# Patient Record
Sex: FEMALE | Race: WHITE | NOT HISPANIC OR LATINO | URBAN - METROPOLITAN AREA
[De-identification: names, ages, dates, MRNs, and addresses within clinical notes are randomized per-mention and may not be internally consistent; named-entity substitution may affect disease eponyms.]

---

## 2018-03-22 VITALS
RESPIRATION RATE: 20 BRPM | HEART RATE: 93 BPM | SYSTOLIC BLOOD PRESSURE: 92 MMHG | HEART RATE: 83 BPM | OXYGEN SATURATION: 99 % | HEART RATE: 97 BPM | HEART RATE: 82 BPM | OXYGEN SATURATION: 100 % | TEMPERATURE: 98.2 F | DIASTOLIC BLOOD PRESSURE: 101 MMHG | DIASTOLIC BLOOD PRESSURE: 69 MMHG | SYSTOLIC BLOOD PRESSURE: 104 MMHG | RESPIRATION RATE: 16 BRPM | DIASTOLIC BLOOD PRESSURE: 61 MMHG | TEMPERATURE: 98.5 F | SYSTOLIC BLOOD PRESSURE: 110 MMHG | DIASTOLIC BLOOD PRESSURE: 73 MMHG | HEIGHT: 67 IN | SYSTOLIC BLOOD PRESSURE: 100 MMHG | SYSTOLIC BLOOD PRESSURE: 114 MMHG | HEART RATE: 71 BPM | WEIGHT: 145 LBS | SYSTOLIC BLOOD PRESSURE: 116 MMHG | DIASTOLIC BLOOD PRESSURE: 70 MMHG | RESPIRATION RATE: 18 BRPM | HEART RATE: 74 BPM | DIASTOLIC BLOOD PRESSURE: 65 MMHG | SYSTOLIC BLOOD PRESSURE: 101 MMHG | SYSTOLIC BLOOD PRESSURE: 102 MMHG | HEART RATE: 76 BPM | RESPIRATION RATE: 14 BRPM | RESPIRATION RATE: 15 BRPM | SYSTOLIC BLOOD PRESSURE: 122 MMHG | DIASTOLIC BLOOD PRESSURE: 55 MMHG

## 2018-03-22 PROBLEM — Z12.11 SCREENING FOR MALIGNANT NEOPLASMS OF COLON: Status: ACTIVE | Noted: 2018-03-23

## 2018-03-23 ENCOUNTER — AMBULATORY SURGICAL CENTER (AMBULATORY)
Dept: URBAN - METROPOLITAN AREA SURGERY 17 | Facility: SURGERY | Age: 53
End: 2018-03-23
Payer: COMMERCIAL

## 2018-03-23 DIAGNOSIS — K64.1 SECOND DEGREE HEMORRHOIDS: ICD-10-CM

## 2018-03-23 DIAGNOSIS — Z83.71 FAMILY HISTORY OF COLONIC POLYPS: ICD-10-CM

## 2018-03-23 DIAGNOSIS — Z12.11 ENCOUNTER FOR SCREENING FOR MALIGNANT NEOPLASM OF COLON: ICD-10-CM

## 2018-03-23 DIAGNOSIS — K57.30 DIVERTICULOSIS OF LARGE INTESTINE WITHOUT PERFORATION OR ABS: ICD-10-CM

## 2018-03-23 PROCEDURE — 45378 DIAGNOSTIC COLONOSCOPY: CPT | Mod: 33 | Performed by: INTERNAL MEDICINE

## 2018-03-23 RX ADMIN — LIDOCAINE HYDROCHLORIDE 25 MG: 10 INJECTION, SOLUTION EPIDURAL; INFILTRATION; INTRACAUDAL; PERINEURAL at 09:38

## 2018-03-23 RX ADMIN — PROPOFOL 50 MG: 10 INJECTION, EMULSION INTRAVENOUS at 09:40

## 2018-03-23 RX ADMIN — PROPOFOL 50 MG: 10 INJECTION, EMULSION INTRAVENOUS at 09:45

## 2018-03-23 RX ADMIN — PROPOFOL 25 MG: 10 INJECTION, EMULSION INTRAVENOUS at 09:48

## 2018-03-23 RX ADMIN — PROPOFOL 100 MG: 10 INJECTION, EMULSION INTRAVENOUS at 09:38

## 2018-03-23 RX ADMIN — PROPOFOL 50 MG: 10 INJECTION, EMULSION INTRAVENOUS at 09:41

## 2018-03-27 ENCOUNTER — OFFICE VISIT (OUTPATIENT)
Dept: FAMILY MEDICINE CLINIC | Facility: CLINIC | Age: 53
End: 2018-03-27

## 2018-03-27 VITALS
HEIGHT: 68 IN | HEART RATE: 88 BPM | WEIGHT: 146 LBS | RESPIRATION RATE: 16 BRPM | DIASTOLIC BLOOD PRESSURE: 72 MMHG | BODY MASS INDEX: 22.13 KG/M2 | OXYGEN SATURATION: 99 % | TEMPERATURE: 98.4 F | SYSTOLIC BLOOD PRESSURE: 122 MMHG

## 2018-03-27 DIAGNOSIS — F41.9 ANXIETY: Primary | ICD-10-CM

## 2018-03-27 PROCEDURE — 99203 OFFICE O/P NEW LOW 30 MIN: CPT | Performed by: FAMILY MEDICINE

## 2018-03-27 RX ORDER — ESCITALOPRAM OXALATE 5 MG/1
5 TABLET ORAL DAILY
COMMUNITY
End: 2018-03-28 | Stop reason: DRUGHIGH

## 2018-03-27 NOTE — PROGRESS NOTES
Gilson Estrella is a 52 y.o. female.     Chief Complaint   Patient presents with   • Establish Care     Patient needs to establish a care.        HPI     Patient presents the office today to discuss a problem that is new to me.  Patient is a new patient to our office.  Agent only significant past medical history is a history of anxiety.  Currently taking and tolerating Lexapro.  She is unsure of the dosage if it is 5 mg daily or 10 mg daily.  She's been on this for about 8 years.  Excellent symptom control.  She wonders whether or not she should continue the medication.  No adverse side effects noted.  Family history significant for hypertension and hyperlipidemia.  She maintains a very healthy diet and exercise regimen.  She denies any tobacco or recreational drug use.  Has maybe 1 alcoholic beverage daily.    The following portions of the patient's history were reviewed and updated as appropriate: allergies, current medications, past family history, past medical history, past social history, past surgical history and problem list.    Review of Systems   Constitutional: Negative for activity change.   All other systems reviewed and are negative.      Objective  Vitals:    03/27/18 1034   BP: 122/72   Pulse: 88   Resp: 16   Temp: 98.4 °F (36.9 °C)   SpO2: 99%       Physical Exam   Constitutional: She is oriented to person, place, and time. She appears well-developed and well-nourished. No distress.   HENT:   Head: Normocephalic and atraumatic.   Right Ear: External ear normal.   Left Ear: External ear normal.   Nose: Nose normal.   Mouth/Throat: Oropharynx is clear and moist.   Eyes: Conjunctivae and EOM are normal. Pupils are equal, round, and reactive to light. Right eye exhibits no discharge. Left eye exhibits no discharge. No scleral icterus.   Neck: Normal range of motion. Neck supple.   Cardiovascular: Normal rate, regular rhythm and normal heart sounds.  Exam reveals no friction rub.    No murmur  heard.  Pulmonary/Chest: Effort normal and breath sounds normal. No respiratory distress. She has no wheezes. She has no rales.   Abdominal: Soft. Bowel sounds are normal. She exhibits no distension. There is no tenderness. There is no rebound and no guarding.   Lymphadenopathy:     She has no cervical adenopathy.   Neurological: She is alert and oriented to person, place, and time.   Skin: Skin is warm and dry. She is not diaphoretic.   Nursing note and vitals reviewed.        Current Outpatient Prescriptions:   •  escitalopram (LEXAPRO) 5 MG tablet, Take 5 mg by mouth Daily., Disp: , Rfl:   •  Multiple Vitamins-Minerals (MULTI-B-PLUS PO), Take  by mouth., Disp: , Rfl:   •  Probiotic Product (PROBIOTIC-10 PO), Take  by mouth., Disp: , Rfl:     Procedures    Lab Results (most recent)     None              Gilson was seen today for establish care.    Diagnoses and all orders for this visit:    Anxiety    Extensive conversation and history reviewed with the patient.  Will continue current medication.  Advised patient to call our office to let us know if it's 5 or 10 mg of Lexapro.  At that point will call in a 30 day supply with 3 refills.  Follow-up in 6 months for routine physical.  Records requested from previous primary care provider.      Return in about 6 months (around 9/27/2018) for Annual physical.      Gaurav Burroughs MD

## 2018-03-28 RX ORDER — ESCITALOPRAM OXALATE 10 MG/1
10 TABLET ORAL DAILY
Qty: 90 TABLET | Refills: 3
Start: 2018-03-28 | End: 2018-04-02 | Stop reason: SDUPTHER

## 2018-04-02 RX ORDER — ESCITALOPRAM OXALATE 10 MG/1
10 TABLET ORAL DAILY
Qty: 90 TABLET | Refills: 3 | Status: SHIPPED | OUTPATIENT
Start: 2018-04-02 | End: 2019-04-01 | Stop reason: SDUPTHER

## 2019-03-21 ENCOUNTER — OFFICE VISIT (OUTPATIENT)
Dept: FAMILY MEDICINE CLINIC | Facility: CLINIC | Age: 54
End: 2019-03-21

## 2019-03-21 ENCOUNTER — TELEPHONE (OUTPATIENT)
Dept: FAMILY MEDICINE CLINIC | Facility: CLINIC | Age: 54
End: 2019-03-21

## 2019-03-21 VITALS
OXYGEN SATURATION: 99 % | SYSTOLIC BLOOD PRESSURE: 108 MMHG | TEMPERATURE: 98.2 F | HEIGHT: 68 IN | WEIGHT: 147 LBS | DIASTOLIC BLOOD PRESSURE: 66 MMHG | HEART RATE: 79 BPM | BODY MASS INDEX: 22.28 KG/M2

## 2019-03-21 DIAGNOSIS — B34.9 VIRAL ILLNESS: ICD-10-CM

## 2019-03-21 DIAGNOSIS — R42 LIGHTHEADEDNESS: Primary | ICD-10-CM

## 2019-03-21 PROCEDURE — 99214 OFFICE O/P EST MOD 30 MIN: CPT | Performed by: NURSE PRACTITIONER

## 2019-03-21 PROCEDURE — 93000 ELECTROCARDIOGRAM COMPLETE: CPT | Performed by: NURSE PRACTITIONER

## 2019-03-21 RX ORDER — ONDANSETRON 4 MG/1
4 TABLET, ORALLY DISINTEGRATING ORAL EVERY 8 HOURS PRN
Qty: 30 TABLET | Refills: 0 | Status: SHIPPED | OUTPATIENT
Start: 2019-03-21 | End: 2020-09-29

## 2019-03-21 NOTE — TELEPHONE ENCOUNTER
Patient Question:    Pt had a first EKG today. Pt mentioned since this is her first EKG, there is nothing for Miller to compare this EKG with. Should patient have another in office EKG scheduled.

## 2019-03-21 NOTE — PROGRESS NOTES
"Subjective   Gilson Estrella is a 53 y.o. female.     History of Present Illness   Patient presenting to the office today with concerns of her lightheadedness nausea and dizziness that started yesterday around 5 PM.  She went about her day as she always does ate the same things drink the same amount of water and then when she was starting to work out she started to feel bad.  She is also struggled with a little bit of diarrhea as well.  She does not feel like he denied fever no constipation no sinus symptoms.  She has not tried any medications for the headache or the nausea.  She is not experiencing any dizziness it does not bother her when she looks over her shoulder when she moves from position to position.  She denies any lab work drawn since 2015 and she never had an EKG.  The following portions of the patient's history were reviewed and updated as appropriate: allergies, current medications, past social history and problem list.    Review of Systems   Gastrointestinal: Positive for nausea.   Neurological: Positive for dizziness and light-headedness.   All other systems reviewed and are negative.      Objective   /66 (BP Location: Right arm, Patient Position: Sitting)   Pulse 79   Temp 98.2 °F (36.8 °C)   Ht 172.7 cm (67.99\")   Wt 66.7 kg (147 lb)   SpO2 99%   BMI 22.36 kg/m²   Physical Exam   Constitutional: She is oriented to person, place, and time. Vital signs are normal. She appears well-developed and well-nourished. No distress.   HENT:   Head: Normocephalic.   Cardiovascular: Normal rate, regular rhythm and normal heart sounds.   Pulmonary/Chest: Effort normal and breath sounds normal.   Neurological: She is alert and oriented to person, place, and time. Gait normal.   Psychiatric: She has a normal mood and affect. Her behavior is normal. Judgment and thought content normal.   Vitals reviewed.      ECG 12 Lead  Date/Time: 3/21/2019 1:35 PM  Performed by: Miller Schaefer APRN  Authorized by: " Miller Schaefer, VELVET   Comparison: not compared with previous ECG   Previous ECG: no previous ECG available  Rhythm: sinus rhythm  Rate: normal  Conduction: conduction normal  ST Segments: ST segments normal  T inversion: V1  QRS axis: normal    Clinical impression: non-specific ECG            Assessment/Plan      Diagnosis Plan   1. Lightheadedness  CBC & Differential    Comprehensive Metabolic Panel    ECG 12 Lead   2. Viral illness  ondansetron ODT (ZOFRAN ODT) 4 MG disintegrating tablet     Follow-up after lab work.  Discussed patient's symptoms at length with her discussed if she has any worsening symptoms that she is go to the emergency room.  Discussed that female patients present differently than male patient is when he comes to heart issues.  Patient verbalized understanding.  At this time I am going to treat her as a viral illness follow-up after lab work he received Zofran for the nausea she is to return to the office if no improvement.  VELVET Otero  3/21/2019

## 2019-03-22 LAB
ALBUMIN SERPL-MCNC: 4.8 G/DL (ref 3.5–5.2)
ALBUMIN/GLOB SERPL: 2 G/DL
ALP SERPL-CCNC: 51 U/L (ref 39–117)
ALT SERPL-CCNC: 17 U/L (ref 1–33)
AST SERPL-CCNC: 19 U/L (ref 1–32)
BASOPHILS # BLD AUTO: 0.02 10*3/MM3 (ref 0–0.2)
BASOPHILS NFR BLD AUTO: 0.5 % (ref 0–1.5)
BILIRUB SERPL-MCNC: 0.3 MG/DL (ref 0.2–1.2)
BUN SERPL-MCNC: 16 MG/DL (ref 6–20)
BUN/CREAT SERPL: 24.6 (ref 7–25)
CALCIUM SERPL-MCNC: 9.9 MG/DL (ref 8.6–10.5)
CHLORIDE SERPL-SCNC: 100 MMOL/L (ref 98–107)
CO2 SERPL-SCNC: 27.9 MMOL/L (ref 22–29)
CREAT SERPL-MCNC: 0.65 MG/DL (ref 0.57–1)
EOSINOPHIL # BLD AUTO: 0.01 10*3/MM3 (ref 0–0.4)
EOSINOPHIL NFR BLD AUTO: 0.2 % (ref 0.3–6.2)
ERYTHROCYTE [DISTWIDTH] IN BLOOD BY AUTOMATED COUNT: 12.3 % (ref 12.3–15.4)
GLOBULIN SER CALC-MCNC: 2.4 GM/DL
GLUCOSE SERPL-MCNC: 104 MG/DL (ref 65–99)
HCT VFR BLD AUTO: 40.2 % (ref 34–46.6)
HGB BLD-MCNC: 12.8 G/DL (ref 12–15.9)
IMM GRANULOCYTES # BLD AUTO: 0.01 10*3/MM3 (ref 0–0.05)
IMM GRANULOCYTES NFR BLD AUTO: 0.2 % (ref 0–0.5)
LYMPHOCYTES # BLD AUTO: 0.92 10*3/MM3 (ref 0.7–3.1)
LYMPHOCYTES NFR BLD AUTO: 21.7 % (ref 19.6–45.3)
MCH RBC QN AUTO: 31.4 PG (ref 26.6–33)
MCHC RBC AUTO-ENTMCNC: 31.8 G/DL (ref 31.5–35.7)
MCV RBC AUTO: 98.5 FL (ref 79–97)
MONOCYTES # BLD AUTO: 0.37 10*3/MM3 (ref 0.1–0.9)
MONOCYTES NFR BLD AUTO: 8.7 % (ref 5–12)
NEUTROPHILS # BLD AUTO: 2.9 10*3/MM3 (ref 1.4–7)
NEUTROPHILS NFR BLD AUTO: 68.7 % (ref 42.7–76)
NRBC BLD AUTO-RTO: 0 /100 WBC (ref 0–0)
PLATELET # BLD AUTO: 300 10*3/MM3 (ref 140–450)
POTASSIUM SERPL-SCNC: 4.4 MMOL/L (ref 3.5–5.2)
PROT SERPL-MCNC: 7.2 G/DL (ref 6–8.5)
RBC # BLD AUTO: 4.08 10*6/MM3 (ref 3.77–5.28)
SODIUM SERPL-SCNC: 140 MMOL/L (ref 136–145)
WBC # BLD AUTO: 4.23 10*3/MM3 (ref 3.4–10.8)

## 2019-04-01 RX ORDER — ESCITALOPRAM OXALATE 10 MG/1
TABLET ORAL
Qty: 90 TABLET | Refills: 2 | Status: SHIPPED | OUTPATIENT
Start: 2019-04-01 | End: 2019-12-30

## 2019-12-30 RX ORDER — ESCITALOPRAM OXALATE 10 MG/1
TABLET ORAL
Qty: 90 TABLET | Refills: 1 | Status: SHIPPED | OUTPATIENT
Start: 2019-12-30 | End: 2020-06-29

## 2020-06-29 RX ORDER — ESCITALOPRAM OXALATE 10 MG/1
TABLET ORAL
Qty: 90 TABLET | Refills: 0 | Status: SHIPPED | OUTPATIENT
Start: 2020-06-29 | End: 2020-07-02

## 2020-07-02 RX ORDER — ESCITALOPRAM OXALATE 10 MG/1
TABLET ORAL
Qty: 90 TABLET | Refills: 0 | Status: SHIPPED | OUTPATIENT
Start: 2020-07-02 | End: 2020-09-29 | Stop reason: SDUPTHER

## 2020-09-16 ENCOUNTER — TELEPHONE (OUTPATIENT)
Dept: FAMILY MEDICINE CLINIC | Facility: CLINIC | Age: 55
End: 2020-09-16

## 2020-09-16 NOTE — TELEPHONE ENCOUNTER
Left message for patient to return call. Can be worked in since this is a reschedule appointment.

## 2020-09-16 NOTE — TELEPHONE ENCOUNTER
PT RETURNED A CALL FROM [facility] TO RESCHEDULE A NEW PATIENT APPOINTMENT FOR DR. RICHARDSON FROM 10/5. NEXT AVAILABLE SHOWING UP IS NOT UNTIL THE END OF December.    PLEASE CALL PT BACK AND SEE IF SHE CAN BE WORKED IN SOONER, OR ADVISE IF SHE WILL NOT BE ABLE TO BE SCHEDULED WITH DR. RICHARDSON. SHE STATES SHE COULD RESCHEDULE FOR SOONER THAN 10/5 ALSO IF THAT WAS AN OPTION.    CALLBACK NUMBER: [phone]

## 2020-09-29 ENCOUNTER — OFFICE VISIT (OUTPATIENT)
Dept: FAMILY MEDICINE CLINIC | Facility: CLINIC | Age: 55
End: 2020-09-29

## 2020-09-29 VITALS
OXYGEN SATURATION: 99 % | SYSTOLIC BLOOD PRESSURE: 118 MMHG | DIASTOLIC BLOOD PRESSURE: 76 MMHG | WEIGHT: 143.2 LBS | HEART RATE: 81 BPM | BODY MASS INDEX: 22.47 KG/M2 | TEMPERATURE: 97.5 F | HEIGHT: 67 IN

## 2020-09-29 DIAGNOSIS — Z00.00 ROUTINE HEALTH MAINTENANCE: ICD-10-CM

## 2020-09-29 DIAGNOSIS — Z13.1 SCREENING FOR DIABETES MELLITUS: ICD-10-CM

## 2020-09-29 DIAGNOSIS — F41.1 GENERALIZED ANXIETY DISORDER: Primary | ICD-10-CM

## 2020-09-29 DIAGNOSIS — Z13.220 SCREENING FOR HYPERLIPIDEMIA: ICD-10-CM

## 2020-09-29 DIAGNOSIS — Z13.29 SCREENING FOR THYROID DISORDER: ICD-10-CM

## 2020-09-29 DIAGNOSIS — Z11.59 NEED FOR HEPATITIS C SCREENING TEST: ICD-10-CM

## 2020-09-29 DIAGNOSIS — Z13.0 SCREENING FOR DEFICIENCY ANEMIA: ICD-10-CM

## 2020-09-29 PROBLEM — R42 LIGHTHEADEDNESS: Status: RESOLVED | Noted: 2019-03-21 | Resolved: 2020-09-29

## 2020-09-29 PROBLEM — B34.9 VIRAL ILLNESS: Status: RESOLVED | Noted: 2019-03-21 | Resolved: 2020-09-29

## 2020-09-29 PROCEDURE — 99214 OFFICE O/P EST MOD 30 MIN: CPT | Performed by: FAMILY MEDICINE

## 2020-09-29 RX ORDER — ESCITALOPRAM OXALATE 20 MG/1
20 TABLET ORAL DAILY
Qty: 30 TABLET | Refills: 2 | Status: SHIPPED | OUTPATIENT
Start: 2020-09-29 | End: 2020-12-04

## 2020-09-29 NOTE — PROGRESS NOTES
Gilson Estrella is a 55 y.o. female.     Chief Complaint   Patient presents with   • Establish Care     pt is here to establish care   • Anxiety     pt sasy that her anxiety is causing sleep issues, would like to switch her lexapro     Masks/face shield/appropriate PPE were worn for the entirety of the visit by the patient, MA, and provider.     HPI     Pt is a pleasant 55 y.o. YO female here for anxiety.  Patient is new to me and is here today to establish care.  She is transferring care from a former [employer] physician.  Her only chronic medical problem is her anxiety.     Anxiety - chronic, has been worsening over the course of the year.  She feels that worsening anxiety is due to several things, she recently got a new progression with work which is been a huge learning curve, she has also had anxiety regarding the pandemic and a lot of the things that were positive and for feeling to her such as social activities and travel are now limited.  She is currently taking escitalopram 10 mg once daily.  She denies any adverse side effects on it and for a couple years has done well on it in terms of anxiety control until more recently.  She has never been on any other medication for her anxiety.  She also states that she has some restless sleeping which she thinks is related to worsened anxiety.    Patient is generally healthy, tries to eat a good diet and exercise regularly.  She is concerned that she had her cholesterol levels checked at work earlier this year and they were mildly elevated and would like to recheck these.  Her father suffered from Lewy body dementia and she had a grandparent with dementia as well, she is trying to be as healthy as possible overall to prevent dementia herself.  She is up-to-date on recommended screening tests including mammogram, colonoscopy, and Pap smear.  She receives immunizations through her work at [employer] and is up-to-date on the shingles vaccine, hepatitis vaccine, and will be  receiving the seasonal flu shot very soon.      The following portions of the patient's history were reviewed by me and updated as appropriate: allergies, current medications, past family history, past medical history, past social history, past surgical history, and problem list.     Review of Systems   Constitutional: Negative.    HENT: Negative.    Eyes: Negative.    Respiratory: Negative.    Cardiovascular: Negative.  Negative for chest pain, palpitations and leg swelling.   Gastrointestinal: Negative.    Endocrine: Negative.    Genitourinary: Negative.    Musculoskeletal: Negative.    Skin: Negative.    Allergic/Immunologic: Negative.    Neurological: Negative.    Hematological: Negative.    Psychiatric/Behavioral: Positive for sleep disturbance. The patient is nervous/anxious.    I have reviewed and confirmed the accuracy of the ROS as documented by the MA/LPN/RN Fany Lion MD    Objective  Vitals:    09/29/20 1101   BP: 118/76   Pulse: 81   Temp: 97.5 °F (36.4 °C)   SpO2: 99%     Body mass index is 22.43 kg/m².       Physical Exam  Vitals signs reviewed.   Constitutional:       General: She is not in acute distress.     Appearance: She is well-developed.   HENT:      Head: Normocephalic and atraumatic.      Right Ear: Tympanic membrane, ear canal and external ear normal.      Left Ear: Tympanic membrane, ear canal and external ear normal.      Nose: Nose normal.      Mouth/Throat:      Mouth: Mucous membranes are moist.      Pharynx: Oropharynx is clear. No oropharyngeal exudate or posterior oropharyngeal erythema.   Eyes:      General: No scleral icterus.        Right eye: No discharge.         Left eye: No discharge.      Conjunctiva/sclera: Conjunctivae normal.   Neck:      Musculoskeletal: Neck supple.      Thyroid: No thyromegaly.   Cardiovascular:      Rate and Rhythm: Normal rate and regular rhythm.      Heart sounds: No murmur. No friction rub. No gallop.    Pulmonary:      Effort: Pulmonary  effort is normal. No respiratory distress.      Breath sounds: Normal breath sounds. No wheezing or rales.   Abdominal:      General: Abdomen is flat. Bowel sounds are normal. There is no distension.      Palpations: Abdomen is soft. There is no mass.      Tenderness: There is no abdominal tenderness.   Lymphadenopathy:      Cervical: No cervical adenopathy.   Skin:     General: Skin is warm and dry.   Neurological:      Mental Status: She is alert and oriented to person, place, and time.   Psychiatric:         Behavior: Behavior normal.         Thought Content: Thought content normal.         Judgment: Judgment normal.           Current Outpatient Medications:   •  escitalopram (LEXAPRO) 20 MG tablet, Take 1 tablet by mouth Daily., Disp: 30 tablet, Rfl: 2  •  MAGNESIUM GLYCINATE PLUS PO, Take  by mouth., Disp: , Rfl:   •  Multiple Vitamins-Minerals (MULTI-B-PLUS PO), Take  by mouth., Disp: , Rfl:   •  Probiotic Product (PROBIOTIC-10 PO), Take  by mouth., Disp: , Rfl:     Procedures    Lab Results (most recent)     None              Gilson was seen today for establish care and anxiety.    Diagnoses and all orders for this visit:    Generalized anxiety disorder  Patient with generalized anxiety disorder and anxiety level has been worsening recently.  Discussed with patient that I would initially trial increasing her S-Citalopram to maximum dose of 20 mg daily.  We will plan on follow-up in 5 weeks, if she does not have significant benefit on this dose will trial another medication such as sertraline or possibly buspirone.  For her sleep difficulties I recommended that she trial over-the-counter melatonin.  -     escitalopram (LEXAPRO) 20 MG tablet; Take 1 tablet by mouth Daily.    Screening for hyperlipidemia  -     Lipid Panel; Future    Screening for deficiency anemia  -     CBC & Differential; Future    Screening for thyroid disorder  -     TSH Rfx On Abnormal To Free T4; Future    Screening for diabetes  mellitus  -     Comprehensive Metabolic Panel; Future    Routine health maintenance  -     Comprehensive Metabolic Panel; Future    Need for hepatitis C screening test  -     Hepatitis C antibody; Future      In addition to the screening lab tests above which patient will have performed fasting another day I did recommend that she check if she has had a tetanus booster/Tdap in the last 10 years, she received immunizations courtesy of her work and so they have a record of what she has had.    Return in about 5 weeks (around 11/3/2020) for Recheck.      Fany Lion MD

## 2020-09-30 ENCOUNTER — RESULTS ENCOUNTER (OUTPATIENT)
Dept: FAMILY MEDICINE CLINIC | Facility: CLINIC | Age: 55
End: 2020-09-30

## 2020-09-30 DIAGNOSIS — Z00.00 ROUTINE HEALTH MAINTENANCE: ICD-10-CM

## 2020-09-30 DIAGNOSIS — Z13.1 SCREENING FOR DIABETES MELLITUS: ICD-10-CM

## 2020-09-30 DIAGNOSIS — Z13.220 SCREENING FOR HYPERLIPIDEMIA: ICD-10-CM

## 2020-09-30 DIAGNOSIS — Z13.29 SCREENING FOR THYROID DISORDER: ICD-10-CM

## 2020-09-30 DIAGNOSIS — Z11.59 NEED FOR HEPATITIS C SCREENING TEST: ICD-10-CM

## 2020-10-02 LAB
ALBUMIN SERPL-MCNC: 4.7 G/DL (ref 3.8–4.9)
ALBUMIN/GLOB SERPL: 1.9 {RATIO} (ref 1.2–2.2)
ALP SERPL-CCNC: 64 IU/L (ref 39–117)
ALT SERPL-CCNC: 17 IU/L (ref 0–32)
AST SERPL-CCNC: 23 IU/L (ref 0–40)
BASOPHILS # BLD AUTO: 0 X10E3/UL (ref 0–0.2)
BASOPHILS NFR BLD AUTO: 1 %
BILIRUB SERPL-MCNC: 0.3 MG/DL (ref 0–1.2)
BUN SERPL-MCNC: 11 MG/DL (ref 6–24)
BUN/CREAT SERPL: 15 (ref 9–23)
CALCIUM SERPL-MCNC: 9.3 MG/DL (ref 8.7–10.2)
CHLORIDE SERPL-SCNC: 99 MMOL/L (ref 96–106)
CHOLEST SERPL-MCNC: 212 MG/DL (ref 100–199)
CO2 SERPL-SCNC: 25 MMOL/L (ref 20–29)
CREAT SERPL-MCNC: 0.73 MG/DL (ref 0.57–1)
EOSINOPHIL # BLD AUTO: 0 X10E3/UL (ref 0–0.4)
EOSINOPHIL NFR BLD AUTO: 1 %
ERYTHROCYTE [DISTWIDTH] IN BLOOD BY AUTOMATED COUNT: 11.8 % (ref 11.7–15.4)
GLOBULIN SER CALC-MCNC: 2.5 G/DL (ref 1.5–4.5)
GLUCOSE SERPL-MCNC: 81 MG/DL (ref 65–99)
HCT VFR BLD AUTO: 37.7 % (ref 34–46.6)
HCV AB S/CO SERPL IA: <0.1 S/CO RATIO (ref 0–0.9)
HDLC SERPL-MCNC: 92 MG/DL
HGB BLD-MCNC: 12.8 G/DL (ref 11.1–15.9)
IMM GRANULOCYTES # BLD AUTO: 0 X10E3/UL (ref 0–0.1)
IMM GRANULOCYTES NFR BLD AUTO: 0 %
LDLC SERPL CALC-MCNC: 110 MG/DL (ref 0–99)
LYMPHOCYTES # BLD AUTO: 1.1 X10E3/UL (ref 0.7–3.1)
LYMPHOCYTES NFR BLD AUTO: 32 %
MCH RBC QN AUTO: 31.4 PG (ref 26.6–33)
MCHC RBC AUTO-ENTMCNC: 34 G/DL (ref 31.5–35.7)
MCV RBC AUTO: 92 FL (ref 79–97)
MONOCYTES # BLD AUTO: 0.4 X10E3/UL (ref 0.1–0.9)
MONOCYTES NFR BLD AUTO: 13 %
NEUTROPHILS # BLD AUTO: 1.8 X10E3/UL (ref 1.4–7)
NEUTROPHILS NFR BLD AUTO: 53 %
PLATELET # BLD AUTO: 295 X10E3/UL (ref 150–450)
POTASSIUM SERPL-SCNC: 4.4 MMOL/L (ref 3.5–5.2)
PROT SERPL-MCNC: 7.2 G/DL (ref 6–8.5)
RBC # BLD AUTO: 4.08 X10E6/UL (ref 3.77–5.28)
SODIUM SERPL-SCNC: 139 MMOL/L (ref 134–144)
TRIGL SERPL-MCNC: 56 MG/DL (ref 0–149)
TSH SERPL DL<=0.005 MIU/L-ACNC: 1.75 UIU/ML (ref 0.45–4.5)
VLDLC SERPL CALC-MCNC: 10 MG/DL (ref 5–40)
WBC # BLD AUTO: 3.3 X10E3/UL (ref 3.4–10.8)

## 2020-10-04 ENCOUNTER — RESULTS ENCOUNTER (OUTPATIENT)
Dept: FAMILY MEDICINE CLINIC | Facility: CLINIC | Age: 55
End: 2020-10-04

## 2020-10-04 DIAGNOSIS — Z13.0 SCREENING FOR DEFICIENCY ANEMIA: ICD-10-CM

## 2020-12-03 DIAGNOSIS — F41.1 GENERALIZED ANXIETY DISORDER: ICD-10-CM

## 2020-12-04 RX ORDER — ESCITALOPRAM OXALATE 20 MG/1
TABLET ORAL
Qty: 90 TABLET | Refills: 1 | Status: SHIPPED | OUTPATIENT
Start: 2020-12-04 | End: 2021-06-02

## 2020-12-04 NOTE — TELEPHONE ENCOUNTER
Patient calling for update on her script that was called in for escitalopram (LEXAPRO) 20 MG tablet. Please advise at [phone]. Pt is out of meds.    Verified pharmacy-[id number] [place] [address] AT [facility] [address] - [phone]  - [phone] FX  [phone]

## 2021-05-31 DIAGNOSIS — F41.1 GENERALIZED ANXIETY DISORDER: ICD-10-CM

## 2021-06-02 RX ORDER — ESCITALOPRAM OXALATE 20 MG/1
TABLET ORAL
Qty: 90 TABLET | Refills: 3 | Status: SHIPPED | OUTPATIENT
Start: 2021-06-02 | End: 2022-05-31 | Stop reason: SDUPTHER

## 2021-08-19 ENCOUNTER — OFFICE VISIT (OUTPATIENT)
Dept: FAMILY MEDICINE CLINIC | Facility: CLINIC | Age: 56
End: 2021-08-19

## 2021-08-19 VITALS
WEIGHT: 142 LBS | BODY MASS INDEX: 22.29 KG/M2 | DIASTOLIC BLOOD PRESSURE: 72 MMHG | OXYGEN SATURATION: 98 % | SYSTOLIC BLOOD PRESSURE: 112 MMHG | HEART RATE: 67 BPM | HEIGHT: 67 IN | TEMPERATURE: 98.7 F

## 2021-08-19 DIAGNOSIS — Z13.1 SCREENING FOR DIABETES MELLITUS: ICD-10-CM

## 2021-08-19 DIAGNOSIS — F41.1 GENERALIZED ANXIETY DISORDER: ICD-10-CM

## 2021-08-19 DIAGNOSIS — Z13.220 SCREENING FOR LIPID DISORDERS: ICD-10-CM

## 2021-08-19 DIAGNOSIS — Z00.00 ENCOUNTER FOR ROUTINE ADULT HEALTH EXAMINATION WITHOUT ABNORMAL FINDINGS: Primary | ICD-10-CM

## 2021-08-19 DIAGNOSIS — Z13.0 SCREENING FOR DEFICIENCY ANEMIA: ICD-10-CM

## 2021-08-19 PROCEDURE — 99396 PREV VISIT EST AGE 40-64: CPT | Performed by: NURSE PRACTITIONER

## 2021-08-19 RX ORDER — ESCITALOPRAM OXALATE 20 MG/1
20 TABLET ORAL DAILY
Qty: 90 TABLET | Refills: 1 | Status: CANCELLED | OUTPATIENT
Start: 2021-08-19

## 2021-08-19 NOTE — PROGRESS NOTES
Chief Complaint  Establish Care (new pt doing well no complains ) and Anxiety (follow up   no complains will  needs refills today )    Subjective          Gilson Estrella presents to [facility]  History of Present Illness  Gilson Estrella is a 55 y.o. female who presents to the clinic today to establish care and for routine health exam. She has a medical history significant for anxiety.  She has no complaints today.    Medical History:   · Anxiety: Patient is currently on escitalopram 20 mg p.o. daily. Patient was previously on 10 mg of escitalopram dose for a while and has felt better with increased dose of 20 mg. She does have mild anxiety related to the pandemic, but in general, her anxiety is much improved on her current medication.  She is sleeping well.  She denies feelings of depression.    Overall, patient states she eats a healthy diet.  She exercises 5 to 6 days a week for about 30 minutes to 45 minutes at a time.  She does a mix of cardio, yoga, and strength.  Patient currently follows with a gynecologist in [place].  She follows with her gynecologist every 2 years.  She has a mammogram scheduled for tomorrow.  Her last Pap smear was 2 years ago.  She had a colonoscopy in 2018 and was recommended for her to have another colonoscopy in 5 years.  Patient is due for Tdap vaccine today, but is not interested.  She is up-to-date on COVID-19 vaccine.  She is not currently sexually active.  She has never smoked and drinks alcohol socially.  She has a follow-up with her dentist next week.  She brushes her teeth at least twice daily.  She just had her yearly eye exam with her optometrist and reported everything was okay.  She wears glasses.    Health Maintenance   Topic Date Due   • ANNUAL PHYSICAL  Never done   • PAP SMEAR  Never done   • MAMMOGRAM  08/09/2021   • TDAP/TD VACCINES (1 - Tdap) 02/14/2022 (Originally 9/8/1984)   • INFLUENZA VACCINE  10/01/2021   • COLORECTAL CANCER  "SCREENING  03/23/2028   • HEPATITIS C SCREENING  Completed   • COVID-19 Vaccine  Completed   • Pneumococcal Vaccine 0-64  Aged Out   • ZOSTER VACCINE  Discontinued     Family History   Problem Relation Age of Onset   • Hyperlipidemia Mother    • Hypertension Mother    • Epilepsy Mother    • Hyperlipidemia Father    • Hypertension Father    • Heart disease Father    • Dementia Father    • No Known Problems Sister    • No Known Problems Brother    • Stroke Maternal Grandmother    • Cancer Maternal Grandfather    • Dementia Paternal Grandfather       Review of Systems   Constitutional: Negative.    HENT: Negative.    Eyes: Negative.    Respiratory: Negative.    Cardiovascular: Negative.    Gastrointestinal: Negative.    Endocrine: Negative.    Genitourinary: Negative.    Musculoskeletal: Negative.    Skin: Negative.    Allergic/Immunologic: Negative.    Neurological: Negative.    Hematological: Negative.    Psychiatric/Behavioral: Negative.       Objective   Vital Signs:   /72   Pulse 67   Temp 98.7 °F (37.1 °C)   Ht 170.2 cm (67\")   Wt 64.4 kg (142 lb)   SpO2 98%   BMI 22.24 kg/m²     Physical Exam  Vitals reviewed.   Constitutional:       Appearance: Normal appearance.   HENT:      Head: Normocephalic and atraumatic.      Right Ear: Tympanic membrane, ear canal and external ear normal. There is no impacted cerumen.      Left Ear: Tympanic membrane, ear canal and external ear normal. There is no impacted cerumen.   Eyes:      General: No scleral icterus.        Right eye: No discharge.         Left eye: No discharge.      Extraocular Movements: Extraocular movements intact.   Neck:      Vascular: No carotid bruit.   Cardiovascular:      Rate and Rhythm: Normal rate and regular rhythm.      Heart sounds: Normal heart sounds. No murmur heard.   No friction rub. No gallop.    Pulmonary:      Effort: No respiratory distress.      Breath sounds: Normal breath sounds. No stridor. No wheezing or rhonchi. "   Abdominal:      General: Bowel sounds are normal. There is no distension.      Palpations: Abdomen is soft.      Tenderness: There is no abdominal tenderness.   Musculoskeletal:         General: Normal range of motion.      Cervical back: Normal range of motion and neck supple. No rigidity or tenderness.      Right lower leg: No edema.      Left lower leg: No edema.   Lymphadenopathy:      Cervical: No cervical adenopathy.   Skin:     General: Skin is warm.   Neurological:      General: No focal deficit present.      Mental Status: She is alert and oriented to person, place, and time.      Motor: No weakness.      Gait: Gait normal.   Psychiatric:         Mood and Affect: Mood normal.         Behavior: Behavior normal.        Result Review :     Common labs    Common Labsle 10/1/20 10/1/20 10/1/20    1214 1214 1215   Glucose  81    BUN  11    Creatinine  0.73    eGFR Non  Am  94    eGFR  Am  108    Sodium  139    Potassium  4.4    Chloride  99    Calcium  9.3    Total Protein  7.2    Albumin  4.7    Total Bilirubin  0.3    Alkaline Phosphatase  64    AST (SGOT)  23    ALT (SGPT)  17    WBC   3.3 (A)   Hemoglobin   12.8   Hematocrit   37.7   Platelets   295   Total Cholesterol 212 (A)     Triglycerides 56     HDL Cholesterol 92     LDL Cholesterol  110 (A)     (A) Abnormal value                   Assessment and Plan    Diagnoses and all orders for this visit:    1. Encounter for routine adult health examination without abnormal findings (Primary)  -     Lipid panel; Future  -     CBC w AUTO Differential; Future  -     Comprehensive metabolic panel; Future    2. Generalized anxiety disorder    3. Screening for lipid disorders  -     Lipid panel; Future    4. Screening for deficiency anemia  -     CBC w AUTO Differential; Future    5. Screening for diabetes mellitus  -     Comprehensive metabolic panel; Future    Other orders  -     Cancel: escitalopram (LEXAPRO) 20 MG tablet; Take 1 tablet by mouth  Daily.  Dispense: 90 tablet; Refill: 1      1. Preventative counseling: Patient is overall maintaining a healthy lifestyle with exercising 5 to 6 days a week and maintaining a healthy diet.  She is due for Tdap vaccine today and patient was educated on this immunization and that it protects against pertussis, diphtheria, tetanus.  Patient does not want to receive this vaccine today.  Patient has a mammogram scheduled tomorrow with her gynecologist in [place].  Patient is up-to-date on her Pap smears.  I will obtain CMP to evaluate kidney and liver function as well as fasting glucose level.  Also obtain lipid panel to evaluate cholesterol level and CBC to evaluate for anemia.  2. Generalized anxiety disorder: Patient will continue on her current dose of escitalopram 20 mg p.o. daily.    Follow Up   Return in about 6 months (around 2/19/2022) for Recheck, anxiety, schedule fasting labs in 1-2 weeks.  Patient was given instructions and counseling regarding her condition or for health maintenance advice. Please see specific information pulled into the AVS if appropriate.     Electronically signed by VELVET Chandler, 08/19/21, 4:41 PM EDT.

## 2021-08-26 ENCOUNTER — TELEPHONE (OUTPATIENT)
Dept: FAMILY MEDICINE CLINIC | Facility: CLINIC | Age: 56
End: 2021-08-26

## 2021-08-26 NOTE — TELEPHONE ENCOUNTER
Pt canceled same day lab appt. She would like to go to [facility]. Lab orders faxed to [phone]

## 2021-09-10 LAB
ALBUMIN SERPL-MCNC: 4.9 G/DL (ref 3.8–4.9)
ALBUMIN/GLOB SERPL: 2 {RATIO} (ref 1.2–2.2)
ALP SERPL-CCNC: 57 IU/L (ref 48–121)
ALT SERPL-CCNC: 15 IU/L (ref 0–32)
AST SERPL-CCNC: 21 IU/L (ref 0–40)
BASOPHILS # BLD AUTO: 0.1 X10E3/UL (ref 0–0.2)
BASOPHILS NFR BLD AUTO: 1 %
BILIRUB SERPL-MCNC: 0.4 MG/DL (ref 0–1.2)
BUN SERPL-MCNC: 15 MG/DL (ref 6–24)
BUN/CREAT SERPL: 21 (ref 9–23)
CALCIUM SERPL-MCNC: 9.8 MG/DL (ref 8.7–10.2)
CHLORIDE SERPL-SCNC: 101 MMOL/L (ref 96–106)
CHOLEST SERPL-MCNC: 264 MG/DL (ref 100–199)
CO2 SERPL-SCNC: 25 MMOL/L (ref 20–29)
CREAT SERPL-MCNC: 0.71 MG/DL (ref 0.57–1)
EOSINOPHIL # BLD AUTO: 0 X10E3/UL (ref 0–0.4)
EOSINOPHIL NFR BLD AUTO: 1 %
ERYTHROCYTE [DISTWIDTH] IN BLOOD BY AUTOMATED COUNT: 11.7 % (ref 11.7–15.4)
GLOBULIN SER CALC-MCNC: 2.5 G/DL (ref 1.5–4.5)
GLUCOSE SERPL-MCNC: 90 MG/DL (ref 65–99)
HCT VFR BLD AUTO: 39.9 % (ref 34–46.6)
HDLC SERPL-MCNC: 106 MG/DL
HGB BLD-MCNC: 13.3 G/DL (ref 11.1–15.9)
IMM GRANULOCYTES # BLD AUTO: 0 X10E3/UL (ref 0–0.1)
IMM GRANULOCYTES NFR BLD AUTO: 0 %
LDLC SERPL CALC-MCNC: 146 MG/DL (ref 0–99)
LYMPHOCYTES # BLD AUTO: 1.1 X10E3/UL (ref 0.7–3.1)
LYMPHOCYTES NFR BLD AUTO: 29 %
MCH RBC QN AUTO: 31.5 PG (ref 26.6–33)
MCHC RBC AUTO-ENTMCNC: 33.3 G/DL (ref 31.5–35.7)
MCV RBC AUTO: 95 FL (ref 79–97)
MONOCYTES # BLD AUTO: 0.5 X10E3/UL (ref 0.1–0.9)
MONOCYTES NFR BLD AUTO: 12 %
NEUTROPHILS # BLD AUTO: 2.2 X10E3/UL (ref 1.4–7)
NEUTROPHILS NFR BLD AUTO: 57 %
PLATELET # BLD AUTO: 293 X10E3/UL (ref 150–450)
POTASSIUM SERPL-SCNC: 5.2 MMOL/L (ref 3.5–5.2)
PROT SERPL-MCNC: 7.4 G/DL (ref 6–8.5)
RBC # BLD AUTO: 4.22 X10E6/UL (ref 3.77–5.28)
SODIUM SERPL-SCNC: 141 MMOL/L (ref 134–144)
TRIGL SERPL-MCNC: 74 MG/DL (ref 0–149)
VLDLC SERPL CALC-MCNC: 12 MG/DL (ref 5–40)
WBC # BLD AUTO: 3.9 X10E3/UL (ref 3.4–10.8)

## 2021-10-06 ENCOUNTER — OFFICE VISIT (OUTPATIENT)
Dept: FAMILY MEDICINE CLINIC | Facility: CLINIC | Age: 56
End: 2021-10-06

## 2021-10-06 VITALS
SYSTOLIC BLOOD PRESSURE: 122 MMHG | BODY MASS INDEX: 22.24 KG/M2 | HEART RATE: 66 BPM | TEMPERATURE: 97.5 F | WEIGHT: 142 LBS | OXYGEN SATURATION: 99 % | RESPIRATION RATE: 16 BRPM | DIASTOLIC BLOOD PRESSURE: 80 MMHG

## 2021-10-06 DIAGNOSIS — F41.1 GENERALIZED ANXIETY DISORDER: ICD-10-CM

## 2021-10-06 DIAGNOSIS — R00.2 HEART PALPITATIONS: Primary | ICD-10-CM

## 2021-10-06 PROCEDURE — 99214 OFFICE O/P EST MOD 30 MIN: CPT | Performed by: NURSE PRACTITIONER

## 2021-10-06 PROCEDURE — 93000 ELECTROCARDIOGRAM COMPLETE: CPT | Performed by: NURSE PRACTITIONER

## 2021-10-06 NOTE — PROGRESS NOTES
[facility] Complaint  Feeling anxious (poss anxiety been happening for the past year last week Tuesday it started back it was hit or missed.)    Subjective          Gilson Estrella presents to [facility]  History of Present Illness  Gilson Estrella is a 56 y.o. female who presents today to discuss anxiety and feelings of heart palpitations.  Patient first noticed her symptoms of heart palpitations about a year ago.  She thought her symptoms were related to caffeine and dehydration.  Her symptoms had resolved, but she has noticed more frequent heart palpitations over the last week.  Her symptoms were worst last Tuesday and she was even awaken from her sleep on Wednesday because of her symptoms. This weekend and this week her symptoms have been hit and miss. Her symptoms resolve without intervention. She denies chest pain, but feels as if her chest is fluttering, like a butterfly. She has been able to exercise without any difficulty and even notes her symptoms resolve with exercise.  Her symptoms are not dependent on the time of day. Her dad had atrial fibrillation. Her heart rate darryl has not been accurately picking up her heart rate in her sleep and she is worried this is because she has an abnormal heart rhyhm. She denies diaphoresis, jaw pain, or shoulder pain. She states her symptoms are not painful, just annoying. She has tried deep breathing techniques that have helped. She does have a history of anxiety and has noted some anxiety surrounding work, but this is not abnormal for her.  She has noted some increased anxiety as she is also currently building a house.  She denies recent panic attacks. She does routinely go to counseling and has an appointment Monday.     Review of Systems   Constitutional: Negative for fatigue.   HENT: Negative.    Respiratory: Negative for chest tightness and shortness of breath.    Cardiovascular: Positive for palpitations. Negative for chest pain and leg  swelling.   Gastrointestinal: Negative.    Endocrine: Negative.    Genitourinary: Negative.    Musculoskeletal: Negative.    Skin: Negative.    Neurological: Negative.    Hematological: Negative.    Psychiatric/Behavioral: The patient is nervous/anxious.       Objective   Vital Signs:   /80   Pulse 66   Temp 97.5 °F (36.4 °C)   Resp 16   Wt 64.4 kg (142 lb)   SpO2 99%   BMI 22.24 kg/m²     Physical Exam  Vitals reviewed.   Constitutional:       General: She is not in acute distress.     Appearance: Normal appearance. She is not toxic-appearing or diaphoretic.   HENT:      Head: Normocephalic and atraumatic.   Cardiovascular:      Rate and Rhythm: Normal rate and regular rhythm.      Pulses: Normal pulses.      Heart sounds: Normal heart sounds. No murmur heard.   No friction rub. No gallop.    Pulmonary:      Effort: Pulmonary effort is normal. No respiratory distress.      Breath sounds: Normal breath sounds. No stridor. No wheezing or rhonchi.   Musculoskeletal:         General: Normal range of motion.      Right lower leg: No edema.      Left lower leg: No edema.   Neurological:      General: No focal deficit present.      Mental Status: She is alert and oriented to person, place, and time.      Motor: No weakness.      Gait: Gait normal.   Psychiatric:         Mood and Affect: Mood normal.         Behavior: Behavior normal.        Result Review :       Data reviewed: Cardiology studies EKG     ECG 12 Lead    Date/Time: 10/6/2021 1:00 PM  Performed by: Araceli Arana APRN  Authorized by: Araceli Arana APRN   Comparison: compared with previous ECG from 3/21/2019  Comparison to previous ECG: No change  Rhythm: sinus rhythm  Rate: normal  BPM: 67  Conduction: conduction normal  ST Segments: ST segments normal  T inversion: V1    Clinical impression: normal ECG        ECG 12 Lead    Date/Time: 10/6/2021 1:15 PM  Performed by: Araceli Arana APRN  Authorized by: Araceli Arana APRN   Comparison:  compared with previous ECG from 3/19/2019  Comparison to previous ECG: No changes  Rhythm: sinus rhythm  Rate: normal  BPM: 66  Conduction: conduction normal  ST Segments: ST segments normal  T inversion: V1  Other: no other findings    Clinical impression: normal ECG              Assessment and Plan    Diagnoses and all orders for this visit:    1. Heart palpitations (Primary)  -     Ambulatory Referral to Cardiology  -     TSH  -     T4, free  -     Basic metabolic panel  -     ECG 12 Lead  -     ECG 12 Lead    2. Generalized anxiety disorder      Patient has had feelings of heart palpitations dating back to about a year ago.  Patient's symptoms have increased over the last week.  She denies any associated symptoms today including shortness of breath, chest pain, diaphoresis, arm pain, or GI symptoms.  2 EKGs were performed in the office today and compared to prior EKG. EKG showed normal sinus rhythm and no change from EKG performed in 2019.  As patient's symptoms have been ongoing, will refer her to cardiology for further evaluation.  Will also perform TSH and free T4 to evaluate for possible thyroid dysfunction and will obtain BMP to evaluate for electrolyte abnormalities.  Patient also has elevated cholesterol LDL on most recent lipid panel with family history of hyperlipidemia.  Discussed with patient that statin medication may be warranted in the future.  We will follow up on this around December or January.     Patient's symptoms may be related to anxiety as she has been experiencing some recent stress over the building of her house and work meetings.  Patient is currently on escitalopram 20 mg p.o. daily.  Patient states she is doing well on this medication and does not wish to change medicine at this time. We also discussed other possible medications like hydroxyzine as needed or adding BuSpar.  Patient has been taking CBD oil in the morning and would like to try taking this at night as well before  starting another medication for anxiety.  She also follow-up with her counselor on Monday.      Follow Up   Return in about 4 weeks (around 11/3/2021) for Recheck, anxiety.   Call for sooner follow-up appointment if symptoms worsen or persist.  Patient educated to the ED for any acute chest pain, diaphoresis, shortness of breath, worsening symptoms.  Patient was given instructions and counseling regarding her condition or for health maintenance advice. Please see specific information pulled into the AVS if appropriate.       Electronically signed by VELVET Chandler, 10/06/21, 3:53 PM EDT.

## 2021-10-07 LAB
BUN SERPL-MCNC: 14 MG/DL (ref 6–20)
BUN/CREAT SERPL: 20.9 (ref 7–25)
CALCIUM SERPL-MCNC: 10.2 MG/DL (ref 8.6–10.5)
CHLORIDE SERPL-SCNC: 99 MMOL/L (ref 98–107)
CO2 SERPL-SCNC: 30.9 MMOL/L (ref 22–29)
CREAT SERPL-MCNC: 0.67 MG/DL (ref 0.57–1)
GLUCOSE SERPL-MCNC: 98 MG/DL (ref 65–99)
POTASSIUM SERPL-SCNC: 5.2 MMOL/L (ref 3.5–5.2)
SODIUM SERPL-SCNC: 139 MMOL/L (ref 136–145)
T4 FREE SERPL-MCNC: 1.1 NG/DL (ref 0.93–1.7)
TSH SERPL DL<=0.005 MIU/L-ACNC: 1.7 UIU/ML (ref 0.27–4.2)

## 2021-10-14 ENCOUNTER — TELEPHONE (OUTPATIENT)
Dept: FAMILY MEDICINE CLINIC | Facility: CLINIC | Age: 56
End: 2021-10-14

## 2021-10-14 DIAGNOSIS — N95.1 PERIMENOPAUSAL VASOMOTOR SYMPTOMS: Primary | ICD-10-CM

## 2021-10-14 NOTE — TELEPHONE ENCOUNTER
Ok. She can call to schedule a lab appt and I will order labs.     Electronically signed by VELVET Chandler, 10/14/21, 4:04 PM EDT.

## 2021-10-14 NOTE — TELEPHONE ENCOUNTER
Caller: Gilson Estrella    Relationship to patient: Self    Best call back number: [phone] (H)    Patient is needing: PATIENT CALLING TO CHECK THE STATUS OF A REFERRAL TO CARDIOLOGY PATIENT STATES SHE HAS NOT RECEIVED A PHONE CALL REGARDING APPOINTMENT        PLEASE ADVISE

## 2021-10-14 NOTE — TELEPHONE ENCOUNTER
It is in clinical review to determine when she should be scheduled. she may call to follow up with them at [phone]    Pt informed v/u

## 2021-10-15 ENCOUNTER — LAB (OUTPATIENT)
Dept: FAMILY MEDICINE CLINIC | Facility: CLINIC | Age: 56
End: 2021-10-15

## 2021-10-15 DIAGNOSIS — N95.1 PERIMENOPAUSAL VASOMOTOR SYMPTOMS: ICD-10-CM

## 2021-10-16 LAB
FSH SERPL-ACNC: 108 MIU/ML
LH SERPL-ACNC: 50.6 MIU/ML

## 2021-11-02 ENCOUNTER — TELEPHONE (OUTPATIENT)
Dept: FAMILY MEDICINE CLINIC | Facility: CLINIC | Age: 56
End: 2021-11-02

## 2021-11-02 NOTE — TELEPHONE ENCOUNTER
Received vm from pt. Pt is following up as she is unsure if she should be starting a hormone patch or seeing cardio. Pt ok'd vm if no answer.    Will advise pt that cardio referral has been placed and scheduling will contact her.   Please advise.

## 2021-11-03 NOTE — TELEPHONE ENCOUNTER
Patient should continue to follow up with cardiology.     Electronically signed by VELVET Chandler, 11/03/21, 7:11 AM EDT.

## 2021-11-04 ENCOUNTER — OFFICE VISIT (OUTPATIENT)
Dept: CARDIOLOGY | Facility: CLINIC | Age: 56
End: 2021-11-04

## 2021-11-04 VITALS
WEIGHT: 146.2 LBS | SYSTOLIC BLOOD PRESSURE: 130 MMHG | HEART RATE: 69 BPM | HEIGHT: 68 IN | DIASTOLIC BLOOD PRESSURE: 84 MMHG | BODY MASS INDEX: 22.16 KG/M2

## 2021-11-04 DIAGNOSIS — R00.2 PALPITATIONS: Primary | ICD-10-CM

## 2021-11-04 PROCEDURE — 93000 ELECTROCARDIOGRAM COMPLETE: CPT | Performed by: INTERNAL MEDICINE

## 2021-11-04 PROCEDURE — 99204 OFFICE O/P NEW MOD 45 MIN: CPT | Performed by: INTERNAL MEDICINE

## 2021-11-04 NOTE — PROGRESS NOTES
[facility]      Patient Name: Gilson Estrella  :1965  Age: 56 y.o.  Encounter Provider:  Andrea Garcia Jr, MD      Chief Complaint:   Chief Complaint   Patient presents with   • Palpitations         HPI  Gilson Estrella is a 56 y.o. female past medical history of anxiety disorder who presents for initial evaluation of palpitations.  About 3 weeks ago she began to have regular palpitations almost daily for 2 weeks.  She notes the palpitations will come on for anywhere between 30 and 60 minutes.  This does not limit her activity and actually she states that by exercising a clearance resolved.  She has had none for the last week.  She denies chest pain or shortness of air.  No dizziness or syncope.  No orthopnea, PND or edema.  She is a lifelong non-smoker who drinks on occasion and denies illicit drug use.  No family history of premature coronary artery disease or sudden cardiac death.      The following portions of the patient's history were reviewed and updated as appropriate: allergies, current medications, past family history, past medical history, past social history, past surgical history and problem list.      Review of Systems   Constitutional: Negative for chills and fever.   HENT: Negative for hoarse voice and sore throat.    Eyes: Negative for double vision and photophobia.   Cardiovascular: Positive for palpitations. Negative for chest pain, leg swelling, near-syncope, orthopnea, paroxysmal nocturnal dyspnea and syncope.   Respiratory: Negative for cough and wheezing.    Skin: Negative for poor wound healing and rash.   Musculoskeletal: Negative for arthritis and joint swelling.   Gastrointestinal: Negative for bloating, abdominal pain, hematemesis and hematochezia.   Neurological: Negative for dizziness and focal weakness.   Psychiatric/Behavioral: Negative for depression and suicidal ideas.       OBJECTIVE:   Vital Signs  Vitals:    21 1319   BP: 130/84   Pulse: 69  "    Estimated body mass index is 22.56 kg/m² as calculated from the following:    Height as of this encounter: 171.5 cm (67.5\").    Weight as of this encounter: 66.3 kg (146 lb 3.2 oz).    Vitals reviewed.   Constitutional:       Appearance: Healthy appearance. Not in distress.   Neck:      Vascular: No JVR. JVD normal.   Pulmonary:      Effort: Pulmonary effort is normal.      Breath sounds: Normal breath sounds. No wheezing. No rhonchi. No rales.   Chest:      Chest wall: Not tender to palpatation.   Cardiovascular:      PMI at left midclavicular line. Normal rate. Regular rhythm. Normal S1. Normal S2.      Murmurs: There is no murmur.      No gallop. No click. No rub.   Pulses:     Intact distal pulses.   Edema:     Peripheral edema absent.   Abdominal:      General: Bowel sounds are normal.      Palpations: Abdomen is soft.      Tenderness: There is no abdominal tenderness.   Musculoskeletal: Normal range of motion.         General: No tenderness. Skin:     General: Skin is warm and dry.   Neurological:      General: No focal deficit present.      Mental Status: Alert and oriented to person, place and time.           ECG 12 Lead    Date/Time: 11/4/2021 1:36 PM  Performed by: Andrea Garcia Jr., MD  Authorized by: Andrea Garcia Jr., MD   Comparison: not compared with previous ECG   Previous ECG: no previous ECG available  Rhythm: sinus rhythm    Clinical impression: normal ECG                  ASSESSMENT:     Palpitations  Anxiety    PLAN OF CARE:     1. Palpitations -patient noted on her last episode that the sensation aborted with Valsalva maneuver.  Sounds that clinically it is more consistent with PSVT.  Will check 24-hour Holter monitor.  Minimize caffeine intake.  Decrease alcohol intake.  She does not have snoring or hypersomnia.  2. Anxiety -she does not note any more environmental stressors than usual.  Currently on Lexapro.  Defer to PCP for ongoing work-up and management.    Return to clinic 3 " months           Discharge Medications          Accurate as of November 4, 2021  1:20 PM. If you have any questions, ask your nurse or doctor.            Continue These Medications      Instructions Start Date   escitalopram 20 MG tablet  Commonly known as: LEXAPRO   TAKE ONE TABLET BY MOUTH DAILY      MAGNESIUM GLYCINATE PLUS PO   Oral      multivitamin with minerals tablet tablet   Oral      PROBIOTIC-10 PO   Oral             Thank you for allowing me to participate in the care of your patient,      Sincerely,   Andrea Garcia MD  [facility]  11/04/21  13:20 EDT

## 2021-11-04 NOTE — PROGRESS NOTES
Chief Complaint  Palpitations (4 weeks f/u)    Subjective          Gilson Estrella presents to [facility]  History of Present Illness   Gilson Estrlela is a 56 y.o. female who presents today to follow-up on her complaints of heart palpitations.  Patient saw the cardiologist yesterday.  An EKG was performed that showed sinus rhythm.  She was placed on a 24-hour Holter monitor.  Since her last visit, patient has had heart palpitations on and off.  She states the symptoms occur at random times.  She does notice heart palpitations with caffeine.  She was on vacation last week.  This Monday, 11/1/2021, she had 1 heart palpitation and has not had any symptoms since.  She did try using the Valsalva maneuver when she had heart palpitations on Monday and it did seem to help.  Patient is also wondering if she needs to be on hormone replacement.  Patient stopped having periods when she had her hysterectomy 5 years ago.  She does report still having her ovaries.  She does not have many menopausal symptoms.  Occasionally, she will have some night sweats, but they are not bothersome.  She also denies vaginal dryness or painful intercourse.  She was not on hormone replacement after her hysterectomy.    Review of Systems   Constitutional: Negative.    HENT: Negative.    Eyes: Negative.    Respiratory: Negative.    Cardiovascular: Negative.    Gastrointestinal: Negative.    Endocrine: Negative.    Genitourinary: Negative.    Musculoskeletal: Negative.    Skin: Negative.    Neurological: Negative.    Hematological: Negative.    Psychiatric/Behavioral: Negative.       Objective   Vital Signs:   /78   Pulse 70   Temp 97.3 °F (36.3 °C)   Resp 16   Wt 66.2 kg (146 lb)   SpO2 98%   BMI 22.53 kg/m²     Physical Exam  Constitutional:       General: She is not in acute distress.     Appearance: Normal appearance. She is not ill-appearing, toxic-appearing or diaphoretic.   HENT:      Head: Normocephalic  and atraumatic.   Eyes:      General: No scleral icterus.        Left eye: No discharge.      Extraocular Movements: Extraocular movements intact.   Cardiovascular:      Rate and Rhythm: Normal rate and regular rhythm.      Heart sounds: Normal heart sounds. No murmur heard.  No friction rub. No gallop.    Pulmonary:      Effort: Pulmonary effort is normal. No respiratory distress.   Neurological:      General: No focal deficit present.      Mental Status: She is alert and oriented to person, place, and time.      Motor: No weakness.      Gait: Gait normal.   Psychiatric:         Mood and Affect: Mood normal.         Behavior: Behavior normal.        Result Review :     Common labs    Common Labsle 9/9/21 9/9/21 9/9/21 10/6/21    0901 0901 0901    Glucose  90  98   BUN  15  14   Creatinine  0.71  0.67   eGFR Non  Am  96  91   eGFR African Am  111  111   Sodium  141  139   Potassium  5.2  5.2   Chloride  101  99   Calcium  9.8  10.2   Total Protein  7.4     Albumin  4.9     Total Bilirubin  0.4     Alkaline Phosphatase  57     AST (SGOT)  21     ALT (SGPT)  15     WBC 3.9      Hemoglobin 13.3      Hematocrit 39.9      Platelets 293      Total Cholesterol   264 (A)    Triglycerides   74    HDL Cholesterol   106    LDL Cholesterol    146 (A)    (A) Abnormal value       Comments are available for some flowsheets but are not being displayed.           Ref Range & Units 3 wk ago   LH   mIU/mL 50.6    Comment:                     Adult Female:                         Follicular phase      2.4 -  12.6                         Ovulation phase      14.0 -  95.6                         Luteal phase          1.0 -  11.4                         Postmenopausal        7.7 -  58.5    FSH   mIU/mL 108.0    Comment:                     Adult Female:                         Follicular phase      3.5 -  12.5                         Ovulation phase       4.7 -  21.5                         Luteal phase          1.7 -   7.7                          Postmenopausal       25.8 - 134.8                  Assessment and Plan    Diagnoses and all orders for this visit:    1. Heart palpitations (Primary)      Patient presents to follow-up on her symptoms of heart palpitations.  Patient was recently seen by the cardiologist and was placed on a 24-hour Holter monitor.  Patient's Holter monitor will end today.  Patient has not had any heart palpitations since 11/1/2021.  Patient also had questions regarding potential hormone replacement for her symptoms.  As patient is having heart palpitations and infrequently and does not report any other menopausal symptoms, I do not feel hormone replacement is warranted at this time.  As she has a history of anxiety, we did discuss using medications like venlafaxine if she does develop any vasomotor symptoms. I advised patient to keep follow-up with cardiology and if she develops further symptoms, we can potentially add venlafaxine.      Follow Up   Return in about 6 months (around 5/5/2022) for Recheck, cholesterol.  Patient was given instructions and counseling regarding her condition or for health maintenance advice. Please see specific information pulled into the AVS if appropriate.     Electronically signed by VELVET Chandler, 11/05/21, 1:14 PM EDT.

## 2021-11-05 ENCOUNTER — OFFICE VISIT (OUTPATIENT)
Dept: FAMILY MEDICINE CLINIC | Facility: CLINIC | Age: 56
End: 2021-11-05

## 2021-11-05 VITALS
WEIGHT: 146 LBS | DIASTOLIC BLOOD PRESSURE: 78 MMHG | SYSTOLIC BLOOD PRESSURE: 122 MMHG | TEMPERATURE: 97.3 F | HEART RATE: 70 BPM | OXYGEN SATURATION: 98 % | RESPIRATION RATE: 16 BRPM | BODY MASS INDEX: 22.53 KG/M2

## 2021-11-05 DIAGNOSIS — R00.2 HEART PALPITATIONS: Primary | ICD-10-CM

## 2021-11-05 PROCEDURE — 99213 OFFICE O/P EST LOW 20 MIN: CPT | Performed by: NURSE PRACTITIONER

## 2021-11-09 ENCOUNTER — TELEPHONE (OUTPATIENT)
Dept: CARDIOLOGY | Facility: CLINIC | Age: 56
End: 2021-11-09

## 2021-11-09 NOTE — TELEPHONE ENCOUNTER
----- Message from VELVET Hayes sent at 11/9/2021  1:14 PM EST -----  Please inform patient holter was benign. average HR 70s. She had rare PVCs and occasional PACs. No changes in rhythm detected. These are not worrisome and not enough ectopy to warrant medication at this time. Recommend to stay active as possible and exercise in addition to Minimize caffeine intake.  Decrease alcohol intake and stay well hydrated. Keep scheduled follow up and call with any concerns or worsening symptoms.

## 2022-05-31 DIAGNOSIS — F41.1 GENERALIZED ANXIETY DISORDER: ICD-10-CM

## 2022-05-31 RX ORDER — ESCITALOPRAM OXALATE 20 MG/1
20 TABLET ORAL DAILY
Qty: 90 TABLET | Refills: 1 | Status: SHIPPED | OUTPATIENT
Start: 2022-05-31

## 2022-05-31 NOTE — TELEPHONE ENCOUNTER
Rx Refill Note  Requested Prescriptions      No prescriptions requested or ordered in this encounter      Last office visit with prescribing clinician: 11/5/2021      Next office visit with prescribing clinician: Visit date not found            Saige Loredo MA  05/31/22, 14:43 EDT